# Patient Record
(demographics unavailable — no encounter records)

---

## 2025-05-22 NOTE — PHYSICAL EXAM
[General Appearance - Alert] : alert [General Appearance - In No Acute Distress] : in no acute distress [Sensation] : the sensory exam was normal to light touch and pinprick [Ankle Swelling (On Exam)] : not present [Varicose Veins Of Lower Extremities] : not present [] : not present [FreeTextEntry3] :  Patient has weak 1/4 dorsalis pedis and 1/4 posterior tibial artery pulses.  Increased temperature gradient and decreased capillary refill time bilaterally.  No acute ischemic changes noted. [FreeTextEntry1] : paoin and incurvayted left hallux medial border   Toenails are thickened, dystrophic, discolored yellow, painful, elongated and with subungual debris 1,2,3,4,5 bilaterally.

## 2025-05-22 NOTE — REASON FOR VISIT
[Follow-Up Visit] : a follow-up visit for [Ingrown Nail] : ingrown nail [FreeTextEntry2] : foot exam

## 2025-05-22 NOTE — ASSESSMENT
[FreeTextEntry1] : Educated patient about peripheral arterial disease.  Discussed importance of walking and exercise to promote collateral circulation.  Recommended screening with a noninvasive vascular study (Pulse Volume Recording / Ankle Brachial Index). Consider vascular surgery consultation. Discussed etiology and treatment options for the painful fungal toenails including proper hygiene/footcare, topical medications and oral antifungals. All fungal toenails debrided 1,2,3,4,5 right and 1,2,3,4,5 left. Continue the antifungal as prescribed. Wedge resection of the left great toe medial border was performed.  Due to the severity of the pain it is likely that there may be an ischemic component.  She will start the Betadine and Band-Aid as directed. She was referred to the vascular surgery group for vascular studies at [facility] in [place]. I am going to see her back in 2 weeks

## 2025-05-22 NOTE — HISTORY OF PRESENT ILLNESS
[FreeTextEntry1] : DUSTIN  is a 84 year old female seen in the  office Patient is present for routine foot exam with c/o ingrown toenail left great toe. Patient states it is painful in the medial side of the toenail. She sees a podiatrist in [place] and saw him 9 weeks ago but she had nail polish on the nail and he was not able to see exactly what was going on